# Patient Record
Sex: FEMALE | URBAN - METROPOLITAN AREA
[De-identification: names, ages, dates, MRNs, and addresses within clinical notes are randomized per-mention and may not be internally consistent; named-entity substitution may affect disease eponyms.]

---

## 2020-10-28 ENCOUNTER — APPOINTMENT (RX ONLY)
Dept: URBAN - METROPOLITAN AREA CLINIC 133 | Facility: CLINIC | Age: 20
Setting detail: DERMATOLOGY
End: 2020-10-28

## 2020-10-28 VITALS — TEMPERATURE: 97.5 F

## 2020-10-28 DIAGNOSIS — Z41.9 ENCOUNTER FOR PROCEDURE FOR PURPOSES OTHER THAN REMEDYING HEALTH STATE, UNSPECIFIED: ICD-10-CM

## 2020-10-28 PROCEDURE — ? CHEMICAL PEEL

## 2020-10-28 ASSESSMENT — LOCATION ZONE DERM: LOCATION ZONE: TRUNK

## 2020-10-28 ASSESSMENT — LOCATION DETAILED DESCRIPTION DERM: LOCATION DETAILED: RIGHT MID-UPPER BACK

## 2020-10-28 ASSESSMENT — LOCATION SIMPLE DESCRIPTION DERM: LOCATION SIMPLE: RIGHT UPPER BACK

## 2020-10-28 NOTE — HPI: COSMETIC (CHEMICAL PEEL)
previous_has_had_a_previous_peel
When Outside In The Sun, Do You...: mostly tans, rarely burns
Additional History: Patient had back surgery done and is now left with two large hyper pigmented scars.

## 2020-10-28 NOTE — PROCEDURE: MIPS QUALITY
Detail Level: Detailed
Quality 431: Preventive Care And Screening: Unhealthy Alcohol Use - Screening: Patient screened for unhealthy alcohol use using a single question and scores less than 2 times per year
Quality 265: Biopsy Follow-Up: Biopsy results reviewed, communicated, tracked, and documented
Quality 402: Tobacco Use And Help With Quitting Among Adolescents: Patient screened for tobacco and never smoked
Quality 226: Preventive Care And Screening: Tobacco Use: Screening And Cessation Intervention: Patient screened for tobacco use and is an ex/non-smoker

## 2020-10-28 NOTE — PROCEDURE: CHEMICAL PEEL
Post Peel Care: After the procedure, Ormedic balancing mask with Vitamin-C brightening boost was applied.
Price (Use Numbers Only, No Special Characters Or $): 160
Number Of Layers: 2
Treatment Number: 1
Prep: Image Peel prep
Consent: Prior to the procedure, written consent was obtained and risks were reviewed, including but not limited to: redness, peeling, blistering, pigmentary change, scarring, infection, and pain.
Treatment Time (Optional): 45 minutes
Comments: Patient tolerated treatment well with no erythema. Extractions were done prior to the peel with a comedone extractor. Steam was used to soften skin and accelerate enzyme activity.\\n\\nI reviewed with the patient in detail post-care instructions. Patient should avoid sun exposure and wear sun protection. Patient was sent home with various gentle moisturizers and sun protectants to use during healing process. She is to apply Vaseline or Aquaphor as needed daily to dry and flaking skin.\\n\\nI recommended that patient use Epionce purifying wash 1-2x weekly to treat breakouts. Patient was sent home with samples to try.
Detail Level: Zone
Chemical Peel: Enzyme Peel